# Patient Record
Sex: MALE | Race: WHITE | NOT HISPANIC OR LATINO | Employment: OTHER | URBAN - METROPOLITAN AREA
[De-identification: names, ages, dates, MRNs, and addresses within clinical notes are randomized per-mention and may not be internally consistent; named-entity substitution may affect disease eponyms.]

---

## 2017-10-05 ENCOUNTER — TRANSFERRED RECORDS (OUTPATIENT)
Dept: HEALTH INFORMATION MANAGEMENT | Facility: CLINIC | Age: 57
End: 2017-10-05

## 2018-03-07 ENCOUNTER — TELEPHONE (OUTPATIENT)
Dept: ORTHOPEDICS | Facility: OTHER | Age: 58
End: 2018-03-07

## 2019-04-23 ENCOUNTER — TRANSFERRED RECORDS (OUTPATIENT)
Dept: HEALTH INFORMATION MANAGEMENT | Facility: CLINIC | Age: 59
End: 2019-04-23

## 2023-07-10 ENCOUNTER — TRANSFERRED RECORDS (OUTPATIENT)
Dept: MULTI SPECIALTY CLINIC | Facility: CLINIC | Age: 63
End: 2023-07-10

## 2023-07-10 LAB
ALBUMIN (URINE) MG/SPEC: 17.8 MG/DL
ALBUMIN/CREATININE RATIO: 77 (ref 0–29)
CREATININE (EXTERNAL): 0.85 MG/DL (ref 0.7–1.2)
CREATININE (URINE): 232 MG/DL
GFR ESTIMATED (EXTERNAL): 98 ML/MIN/1.73M2
GLUCOSE (EXTERNAL): 150 MG/DL (ref 70–99)
HBA1C MFR BLD: 6.2 % (ref 4–5.6)
POTASSIUM (EXTERNAL): 4 MEQ/L (ref 3.4–5.1)

## 2023-11-09 ENCOUNTER — OFFICE VISIT (OUTPATIENT)
Dept: FAMILY MEDICINE | Facility: OTHER | Age: 63
DRG: 442 | End: 2023-11-09
Payer: COMMERCIAL

## 2023-11-09 ENCOUNTER — HOSPITAL ENCOUNTER (INPATIENT)
Facility: OTHER | Age: 63
LOS: 1 days | Discharge: HOME | DRG: 442 | End: 2023-11-10
Attending: FAMILY MEDICINE | Admitting: INTERNAL MEDICINE
Payer: COMMERCIAL

## 2023-11-09 VITALS
BODY MASS INDEX: 35.42 KG/M2 | OXYGEN SATURATION: 99 % | HEART RATE: 75 BPM | SYSTOLIC BLOOD PRESSURE: 124 MMHG | TEMPERATURE: 97.2 F | WEIGHT: 253 LBS | RESPIRATION RATE: 12 BRPM | HEIGHT: 71 IN | DIASTOLIC BLOOD PRESSURE: 82 MMHG

## 2023-11-09 DIAGNOSIS — E72.20 HYPERAMMONEMIA (H): ICD-10-CM

## 2023-11-09 DIAGNOSIS — K70.30 ALCOHOLIC CIRRHOSIS, UNSPECIFIED WHETHER ASCITES PRESENT (H): Chronic | ICD-10-CM

## 2023-11-09 DIAGNOSIS — F15.10 AMPHETAMINE ABUSE (H): ICD-10-CM

## 2023-11-09 DIAGNOSIS — R41.0 CONFUSION: ICD-10-CM
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
## 2023-11-09 DIAGNOSIS — F22 DELUSIONS OF PARASITOSIS (H): Primary | ICD-10-CM

## 2023-11-09 PROBLEM — Z95.828 PORT-A-CATH IN PLACE: Status: ACTIVE | Noted: 2022-07-08

## 2023-11-09 PROBLEM — E11.9 TYPE 2 DIABETES MELLITUS WITHOUT COMPLICATION (H): Chronic | Status: ACTIVE | Noted: 2021-11-30

## 2023-11-09 PROBLEM — C22.0 HEPATOCELLULAR CARCINOMA (H): Status: ACTIVE | Noted: 2022-10-19

## 2023-11-09 PROBLEM — I85.10 SECONDARY ESOPHAGEAL VARICES WITHOUT BLEEDING (H): Status: ACTIVE | Noted: 2017-03-28

## 2023-11-09 PROBLEM — C81.41: Status: ACTIVE | Noted: 2022-05-04

## 2023-11-09 LAB
ALBUMIN SERPL BCG-MCNC: 3.8 G/DL (ref 3.5–5.2)
ALBUMIN UR-MCNC: 30 MG/DL
ALP SERPL-CCNC: 208 U/L (ref 40–129)
ALT SERPL W P-5'-P-CCNC: 57 U/L (ref 0–70)
AMMONIA PLAS-SCNC: 83 UMOL/L (ref 16–60)
AMPHETAMINES UR QL SCN: ABNORMAL
ANION GAP SERPL CALCULATED.3IONS-SCNC: 9 MMOL/L (ref 7–15)
APPEARANCE UR: CLEAR
AST SERPL W P-5'-P-CCNC: 76 U/L (ref 0–45)
BACTERIA #/AREA URNS HPF: ABNORMAL /HPF
BARBITURATES UR QL SCN: ABNORMAL
BASOPHILS # BLD AUTO: 0 10E3/UL (ref 0–0.2)
BASOPHILS NFR BLD AUTO: 1 %
BENZODIAZ UR QL SCN: ABNORMAL
BILIRUB SERPL-MCNC: 2 MG/DL
BILIRUB UR QL STRIP: NEGATIVE
BUN SERPL-MCNC: 13.7 MG/DL (ref 8–23)
BZE UR QL SCN: ABNORMAL
CALCIUM SERPL-MCNC: 9.2 MG/DL (ref 8.8–10.2)
CANNABINOIDS UR QL SCN: ABNORMAL
CHLORIDE SERPL-SCNC: 105 MMOL/L (ref 98–107)
COLOR UR AUTO: YELLOW
CREAT SERPL-MCNC: 0.65 MG/DL (ref 0.67–1.17)
DEPRECATED HCO3 PLAS-SCNC: 27 MMOL/L (ref 22–29)
EGFRCR SERPLBLD CKD-EPI 2021: >90 ML/MIN/1.73M2
EOSINOPHIL # BLD AUTO: 0.1 10E3/UL (ref 0–0.7)
EOSINOPHIL NFR BLD AUTO: 3 %
ERYTHROCYTE [DISTWIDTH] IN BLOOD BY AUTOMATED COUNT: 14.6 % (ref 10–15)
ETHANOL SERPL-MCNC: <0.01 G/DL
ETHANOL SERPL-MCNC: <0.01 G/DL
FENTANYL UR QL: ABNORMAL
GLUCOSE BLDC GLUCOMTR-MCNC: 88 MG/DL (ref 70–99)
GLUCOSE SERPL-MCNC: 173 MG/DL (ref 70–99)
GLUCOSE UR STRIP-MCNC: NEGATIVE MG/DL
HCT VFR BLD AUTO: 45.2 % (ref 40–53)
HGB BLD-MCNC: 15.5 G/DL (ref 13.3–17.7)
HGB UR QL STRIP: NEGATIVE
HOLD SPECIMEN: NORMAL
HYALINE CASTS: 1 /LPF
IMM GRANULOCYTES # BLD: 0 10E3/UL
IMM GRANULOCYTES NFR BLD: 0 %
KETONES UR STRIP-MCNC: NEGATIVE MG/DL
LEUKOCYTE ESTERASE UR QL STRIP: NEGATIVE
LYMPHOCYTES # BLD AUTO: 0.7 10E3/UL (ref 0.8–5.3)
LYMPHOCYTES NFR BLD AUTO: 15 %
MCH RBC QN AUTO: 32.1 PG (ref 26.5–33)
MCHC RBC AUTO-ENTMCNC: 34.3 G/DL (ref 31.5–36.5)
MCV RBC AUTO: 94 FL (ref 78–100)
MONOCYTES # BLD AUTO: 0.4 10E3/UL (ref 0–1.3)
MONOCYTES NFR BLD AUTO: 8 %
MUCOUS THREADS #/AREA URNS LPF: PRESENT /LPF
NEUTROPHILS # BLD AUTO: 3.6 10E3/UL (ref 1.6–8.3)
NEUTROPHILS NFR BLD AUTO: 73 %
NITRATE UR QL: NEGATIVE
NRBC # BLD AUTO: 0 10E3/UL
NRBC BLD AUTO-RTO: 0 /100
OPIATES UR QL SCN: ABNORMAL
PCP QUAL URINE (ROCHE): ABNORMAL
PH UR STRIP: 5.5 [PH] (ref 5–9)
PLATELET # BLD AUTO: 92 10E3/UL (ref 150–450)
POTASSIUM SERPL-SCNC: 3.6 MMOL/L (ref 3.4–5.3)
PROT SERPL-MCNC: 7.5 G/DL (ref 6.4–8.3)
RBC # BLD AUTO: 4.83 10E6/UL (ref 4.4–5.9)
RBC URINE: 2 /HPF
SODIUM SERPL-SCNC: 141 MMOL/L (ref 135–145)
SP GR UR STRIP: 1.03 (ref 1–1.03)
UROBILINOGEN UR STRIP-MCNC: 8 MG/DL
WBC # BLD AUTO: 4.8 10E3/UL (ref 4–11)
WBC URINE: 3 /HPF

## 2023-11-09 PROCEDURE — 80307 DRUG TEST PRSMV CHEM ANLYZR: CPT | Performed by: FAMILY MEDICINE

## 2023-11-09 PROCEDURE — 85014 HEMATOCRIT: CPT | Mod: ZL

## 2023-11-09 PROCEDURE — 87484 EHRLICHA CHAFFEENSIS AMP PRB: CPT | Mod: ZL

## 2023-11-09 PROCEDURE — 86618 LYME DISEASE ANTIBODY: CPT | Mod: ZL

## 2023-11-09 PROCEDURE — 82140 ASSAY OF AMMONIA: CPT | Mod: ZL

## 2023-11-09 PROCEDURE — 36415 COLL VENOUS BLD VENIPUNCTURE: CPT | Mod: ZL

## 2023-11-09 PROCEDURE — G0463 HOSPITAL OUTPT CLINIC VISIT: HCPCS

## 2023-11-09 PROCEDURE — 99284 EMERGENCY DEPT VISIT MOD MDM: CPT | Performed by: FAMILY MEDICINE

## 2023-11-09 PROCEDURE — 87798 DETECT AGENT NOS DNA AMP: CPT | Mod: ZL

## 2023-11-09 PROCEDURE — 250N000013 HC RX MED GY IP 250 OP 250 PS 637: Performed by: INTERNAL MEDICINE

## 2023-11-09 PROCEDURE — 81003 URINALYSIS AUTO W/O SCOPE: CPT | Performed by: FAMILY MEDICINE

## 2023-11-09 PROCEDURE — 80053 COMPREHEN METABOLIC PANEL: CPT | Mod: ZL

## 2023-11-09 PROCEDURE — 250N000013 HC RX MED GY IP 250 OP 250 PS 637: Performed by: FAMILY MEDICINE

## 2023-11-09 PROCEDURE — 99285 EMERGENCY DEPT VISIT HI MDM: CPT | Performed by: FAMILY MEDICINE

## 2023-11-09 PROCEDURE — 36415 COLL VENOUS BLD VENIPUNCTURE: CPT | Performed by: FAMILY MEDICINE

## 2023-11-09 PROCEDURE — 82077 ASSAY SPEC XCP UR&BREATH IA: CPT | Mod: ZL

## 2023-11-09 PROCEDURE — 120N000001 HC R&B MED SURG/OB

## 2023-11-09 PROCEDURE — 82077 ASSAY SPEC XCP UR&BREATH IA: CPT | Performed by: FAMILY MEDICINE

## 2023-11-09 PROCEDURE — 258N000003 HC RX IP 258 OP 636: Performed by: FAMILY MEDICINE

## 2023-11-09 RX ORDER — PROPRANOLOL HYDROCHLORIDE 80 MG/1
80 CAPSULE, EXTENDED RELEASE ORAL DAILY
Status: DISCONTINUED | OUTPATIENT
Start: 2023-11-10 | End: 2023-11-10 | Stop reason: HOSPADM

## 2023-11-09 RX ORDER — CYANOCOBALAMIN (VITAMIN B-12) 1000 MCG
1 TABLET ORAL DAILY
COMMUNITY

## 2023-11-09 RX ORDER — TAMSULOSIN HYDROCHLORIDE 0.4 MG/1
0.4 CAPSULE ORAL EVERY EVENING
Status: DISCONTINUED | OUTPATIENT
Start: 2023-11-10 | End: 2023-11-10 | Stop reason: HOSPADM

## 2023-11-09 RX ORDER — FERROUS SULFATE 325(65) MG
325 TABLET ORAL
COMMUNITY
Start: 2023-09-08

## 2023-11-09 RX ORDER — LACTULOSE 20 G/30ML
10 SOLUTION ORAL 3 TIMES DAILY
Status: DISCONTINUED | OUTPATIENT
Start: 2023-11-09 | End: 2023-11-10 | Stop reason: HOSPADM

## 2023-11-09 RX ORDER — OXYCODONE HYDROCHLORIDE 5 MG/1
5 TABLET ORAL
Status: ON HOLD | COMMUNITY
Start: 2022-11-19 | End: 2023-11-10

## 2023-11-09 RX ORDER — LEVOFLOXACIN 500 MG/1
TABLET, FILM COATED ORAL
Status: ON HOLD | COMMUNITY
Start: 2022-11-19 | End: 2023-11-10

## 2023-11-09 RX ORDER — PANTOPRAZOLE SODIUM 40 MG/1
40 TABLET, DELAYED RELEASE ORAL
Status: DISCONTINUED | OUTPATIENT
Start: 2023-11-10 | End: 2023-11-10 | Stop reason: HOSPADM

## 2023-11-09 RX ORDER — DEXTROSE MONOHYDRATE 25 G/50ML
25-50 INJECTION, SOLUTION INTRAVENOUS
Status: DISCONTINUED | OUTPATIENT
Start: 2023-11-09 | End: 2023-11-10 | Stop reason: HOSPADM

## 2023-11-09 RX ORDER — NICOTINE POLACRILEX 4 MG
15-30 LOZENGE BUCCAL
Status: DISCONTINUED | OUTPATIENT
Start: 2023-11-09 | End: 2023-11-10 | Stop reason: HOSPADM

## 2023-11-09 RX ORDER — TAMSULOSIN HYDROCHLORIDE 0.4 MG/1
0.4 CAPSULE ORAL
COMMUNITY
Start: 2023-01-08

## 2023-11-09 RX ORDER — FUROSEMIDE 40 MG
TABLET ORAL
COMMUNITY
Start: 2023-09-08

## 2023-11-09 RX ORDER — TRAMADOL HYDROCHLORIDE 50 MG/1
50-100 TABLET ORAL EVERY 6 HOURS PRN
Status: ON HOLD | COMMUNITY
Start: 2023-09-08 | End: 2023-11-10

## 2023-11-09 RX ORDER — ASPIRIN 325 MG
325 TABLET, DELAYED RELEASE (ENTERIC COATED) ORAL DAILY
COMMUNITY

## 2023-11-09 RX ORDER — METFORMIN HCL 500 MG
1000 TABLET, EXTENDED RELEASE 24 HR ORAL 2 TIMES DAILY WITH MEALS
COMMUNITY
Start: 2023-05-24

## 2023-11-09 RX ORDER — VALACYCLOVIR HYDROCHLORIDE 1 G/1
1 TABLET, FILM COATED ORAL
Status: ON HOLD | COMMUNITY
Start: 2023-01-05 | End: 2023-11-10

## 2023-11-09 RX ORDER — BLOOD SUGAR DIAGNOSTIC
STRIP MISCELLANEOUS
COMMUNITY
Start: 2022-12-08

## 2023-11-09 RX ORDER — LANOLIN ALCOHOL/MO/W.PET/CERES
CREAM (GRAM) TOPICAL
COMMUNITY
Start: 2023-09-08

## 2023-11-09 RX ORDER — GABAPENTIN 300 MG/1
1 CAPSULE ORAL EVERY EVENING
Status: ON HOLD | COMMUNITY
Start: 2023-04-10 | End: 2023-11-10

## 2023-11-09 RX ORDER — LACTULOSE 20 G/30ML
20 SOLUTION ORAL DAILY
Status: DISCONTINUED | OUTPATIENT
Start: 2023-11-09 | End: 2023-11-09

## 2023-11-09 RX ORDER — SODIUM CHLORIDE 9 MG/ML
INJECTION, SOLUTION INTRAVENOUS CONTINUOUS
Status: DISCONTINUED | OUTPATIENT
Start: 2023-11-09 | End: 2023-11-10

## 2023-11-09 RX ORDER — OMEGA-3 FATTY ACIDS/FISH OIL 300-1000MG
400 CAPSULE ORAL
Status: ON HOLD | COMMUNITY
End: 2023-11-10

## 2023-11-09 RX ORDER — PROPRANOLOL HYDROCHLORIDE 80 MG/1
80 CAPSULE, EXTENDED RELEASE ORAL DAILY
COMMUNITY
Start: 2023-09-07

## 2023-11-09 RX ADMIN — LACTULOSE 20 G: 20 SOLUTION ORAL at 16:47

## 2023-11-09 RX ADMIN — SODIUM CHLORIDE: 9 INJECTION, SOLUTION INTRAVENOUS at 22:25

## 2023-11-09 RX ADMIN — LACTULOSE 10 G: 20 SOLUTION ORAL at 23:50

## 2023-11-09 RX ADMIN — SODIUM CHLORIDE: 9 INJECTION, SOLUTION INTRAVENOUS at 19:39

## 2023-11-09 ASSESSMENT — ACTIVITIES OF DAILY LIVING (ADL)
ADLS_ACUITY_SCORE: 35
ADLS_ACUITY_SCORE: 35
ADLS_ACUITY_SCORE: 22
ADLS_ACUITY_SCORE: 35

## 2023-11-09 ASSESSMENT — PAIN SCALES - GENERAL: PAINLEVEL: NO PAIN (0)

## 2023-11-09 ASSESSMENT — ENCOUNTER SYMPTOMS: CONFUSION: 1

## 2023-11-09 NOTE — ED TRIAGE NOTES
"Pt was brought over from the [facility].   When this writer questioned why he was in the ER he stated that he did not know.  Called the [facility] for pt information.   Was informed that pt's ammonia level was elevated and he is hallucinating.       Questioned pt on the hallucination and he stated that he sees movement out of the corner of your eyes.   He will sometime see a person or a mouse, etc....  BP (!) 149/95   Pulse 66   Temp 98.1  F (36.7  C) (Temporal)   Resp 16   Ht 1.803 m (5' 11\")   Wt 114.8 kg (253 lb)   SpO2 97%   BMI 35.29 kg/m    Sally Ashton RN on 11/9/2023 at 4:08 PM          Triage Assessment (Adult)       Row Name 11/09/23 1603          Triage Assessment    Airway WDL WDL        Respiratory WDL    Respiratory WDL WDL        Skin Circulation/Temperature WDL    Skin Circulation/Temperature WDL WDL        Cardiac WDL    Cardiac WDL WDL        Cognitive/Neuro/Behavioral WDL    Cognitive/Neuro/Behavioral WDL WDL                     "

## 2023-11-09 NOTE — NURSING NOTE
"Pt presents to [facility] for bugs that he sees coming out of his skin since Sunday night. Pt states \"I put oil on my arms and it made them all come out, so I wiped them off with a towel.\" Pt denies any pain or itching in his arms.  Pt cannot see these spots anywhere else - only on his arms.    Chief Complaint   Patient presents with    Derm Problem     Both forearms       FOOD SECURITY SCREENING QUESTIONS  Hunger Vital Signs:  Within the past 12 months we worried whether our food would run out before we got money to buy more. Never  Within the past 12 months the food we bought just didn't last and we didn't have money to get more. Never  Sanam Dearmon 11/9/2023 1:47 PM      Initial /82 (BP Location: Right arm, Patient Position: Sitting, Cuff Size: Adult Large)   Pulse 75   Temp 97.2  F (36.2  C) (Tympanic)   Resp 12   Ht 1.803 m (5' 11\")   Wt 114.8 kg (253 lb)   SpO2 99%   BMI 35.29 kg/m   Estimated body mass index is 35.29 kg/m  as calculated from the following:    Height as of this encounter: 1.803 m (5' 11\").    Weight as of this encounter: 114.8 kg (253 lb).  Medication Reconciliation: complete    Sanma Espinosa  "
(4) completely dependent

## 2023-11-09 NOTE — PROGRESS NOTES
"ASSESSMENT/PLAN:    Triaged from: [facility]    DIAGNOSIS:   1. Delusions of parasitosis (H)    2. Tick bite, unspecified site, initial encounter    3. Hyperammonemia (H24)    Patient presents with concerns of a rash to his arms that have had worms crawling out of his arms.  He notes this started about 4 days ago.  Patient does have a history of cirrhosis of the liver with alcoholism, he denies any alcohol intake in the past 6 years.  He reports his mood has been stable and no history of mental health issues per his report.  He did have a tick bite a week or 2 ago per his report.  He denies any urinary or abdominal symptoms.  On exam he is alert and oriented to person place and time, cranial nerves intact, no abdominal pain or pressure, vital signs are stable.  Bilateral upper extremities with multiple sores and surrounding ecchymosis as seen in picture below.  No worms appreciated.   I did discuss this case with my collaborating physician who recommends with new mental status change recheck of ammonia level.  This was obtained and was elevated, she recommends transfer to ER.  Discussed this case with ER physician who accepts care of patient.  I discussed with patient laboratory findings at length and my concerns as well as recommendations for further work-up in ER, he is in agreement to this plan and verbalizes understanding.    Initial /82 (BP Location: Right arm, Patient Position: Sitting, Cuff Size: Adult Large)   Pulse 75   Temp 97.2  F (36.2  C) (Tympanic)   Resp 12   Ht 1.803 m (5' 11\")   Wt 114.8 kg (253 lb)   SpO2 99%   BMI 35.29 kg/m   Estimated body mass index is 35.29 kg/m  as calculated from the following:    Height as of this encounter: 1.803 m (5' 11\").    Weight as of this encounter: 114.8 kg (253 lb).    Patient will be transferred to higher level of care.    ESTEFANY DÍAZ CNP        Discussed warning signs/symptoms indicative of need to f/u    Follow up if symptoms persist " or worsen or concerns    I have reviewed the nursing notes.  I have reviewed the findings, diagnosis, plan and need for follow up with the patient.    I explained my diagnostic considerations and recommendations to the patient, who voiced understanding and agreement with the treatment plan. All questions were answered. We discussed potential side effects of any prescribed or recommended therapies, as well as expectations for response to treatments.    JESÚS ABRAHAN STREETER, ESTEFANY CNP  11/9/2023  1:58 PM    HPI:    Elbert Comer is a 63 year old male  who presents to [facility] today for concerns of skin lesions.     Patient reports he has had a rash to bilateral upper extremities that started 4 days ago. He reports that the rash is spreading and there have been white worms crawling out of his sores. He rubbed baking grease on his arms and the worms started crawling out of the sores.      He denies any urinary symptoms, no fevers, no abdominal pain.  He does have a history of alcoholism, but reports that he has not had a drink in 6 years since he was diagnosed with cirrhosis.  He denies any substance use other than tobacco, no illicit drugs or prescribed narcotics.    He reports his mood has been stable, no concerns for him.    Patient reports that he did have a tick bite somewhere around a week ago, it was on his chest and he did remove the tick.  Unknown species.  Unknown duration of attachment.    No known medication allergies.      Past Medical History:   Diagnosis Date    NO ACTIVE PROBLEMS      No past surgical history on file.  Social History     Tobacco Use    Smoking status: Every Day     Packs/day: 0.00     Years: 25.00     Additional pack years: 0.00     Total pack years: 0.00     Types: Cigarettes    Smokeless tobacco: Never    Tobacco comments:     4 -5 qd   Substance Use Topics    Alcohol use: Not Currently     Comment: one beer QD     Current Outpatient Medications   Medication Sig Dispense Refill     aspirin (ASA) 325 MG EC tablet Take 325 mg by mouth daily      blood glucose (NO BRAND SPECIFIED) test strip Check up 2 two times daily      Cyanocobalamin (B-12) 1000 MCG TABS Take 1 tablet by mouth daily      Ergocalciferol 50 MCG (2000 UT) TABS Take  by mouth one time a day. 1 unit of Vitamin D equals 0.025 mcg of Vitamin D      ferrous sulfate (FEROSUL) 325 (65 Fe) MG tablet Take 325 mg by mouth      furosemide (LASIX) 40 MG tablet TAKE ONE TAB BY MOUTH DAILY AS NEEDED FOR FLUID RETENTION      ibuprofen (ADVIL/MOTRIN) 200 MG capsule Take 400 mg by mouth      insulin glargine (LANTUS PEN) 100 UNIT/ML pen 10 units of Lantus insulin in the morning for blood sugars over 140 and none if it is less.      levofloxacin (LEVAQUIN) 500 MG tablet       MAGNESIUM OXIDE 400 (240 Mg) MG tablet TAKE 1 TAB BY MOUTH TWICE DAILY      metFORMIN (GLUCOPHAGE XR) 500 MG 24 hr tablet Take 1,000 mg by mouth      omeprazole (PRILOSEC) 20 MG DR capsule Take 20 mg by mouth      ONETOUCH ULTRA test strip USE TO CHECK BLOOD SUGAR UP 2 TWO TIMES DAILY      oxyCODONE (ROXICODONE) 5 MG tablet Take 5 mg by mouth      PERCOCET 5-325 MG OR TABS 1-2 TABLET EVERY 4 TO 6 HOURS AS NEEDED 30 0    propranolol ER (INDERAL LA) 80 MG 24 hr capsule Take 80 mg by mouth      tamsulosin (FLOMAX) 0.4 MG capsule Take 0.4 mg by mouth      traMADol (ULTRAM) 50 MG tablet Take  mg by mouth every 6 hours as needed for severe pain      valACYclovir (VALTREX) 1000 mg tablet Take 1 tablet by mouth 3 times daily      gabapentin (NEURONTIN) 300 MG capsule Take 1 capsule by mouth every evening (Patient not taking: Reported on 11/9/2023)       Allergies   Allergen Reactions    No Known Drug Allergy      Past medical history, past surgical history, current medications and allergies reviewed and accurate to the best of my knowledge.      ROS:  Refer to HPI    /82 (BP Location: Right arm, Patient Position: Sitting, Cuff Size: Adult Large)   Pulse 75   Temp 97.2  " F (36.2  C) (Tympanic)   Resp 12   Ht 1.803 m (5' 11\")   Wt 114.8 kg (253 lb)   SpO2 99%   BMI 35.29 kg/m      EXAM:  General Appearance: Well appearing 63 year old male, appropriate appearance for age. No acute distress   Ears: Left TM intact, translucent with bony landmarks appreciated, no erythema, no effusion, no bulging, no purulence.  Right TM intact, translucent with bony landmarks appreciated, no erythema, no effusion, no bulging, no purulence.  Left auditory canal clear.  Right auditory canal clear.  Normal external ears, non tender.  Eyes: conjunctivae normal without erythema or irritation, corneas clear, no drainage or crusting, no eyelid swelling, pupils equal   Oropharynx: moist mucous membranes, posterior pharynx with erythema, no exudates or petechiae, no post nasal drip seen, no trismus, voice clear.    Sinuses:  No sinus tenderness upon palpation of the frontal or maxillary sinuses  Nose:  Bilateral nares: no erythema, no edema, no drainage or congestion   Neck: supple without adenopathy  Respiratory: normal chest wall and respirations.  Normal effort.  Clear to auscultation bilaterally, no wheezing, crackles or rhonchi.  No increased work of breathing.  No cough appreciated.  Cardiac: RRR with no murmurs  Abdomen: nontender, no rigidity, no rebound tenderness or guarding, normal bowel sounds present  Musculoskeletal:  Equal movement of bilateral upper extremities.  Equal movement of bilateral lower extremities.  Normal gait.    Dermatological: Bilateral upper extremities with multiple scabbed sores with surrounding ecchymosis, no parasites or insects appreciated at wound sites, no drainage from the wounds, no surrounding erythema, pictured below.  Neuro: Alert and oriented to person, place, and time.  Cranial nerves II-XII grossly intact with no focal or lateralizing deficits.  Muscle tone normal.  Gait normal. No tremor.   Psychological: normal affect, alert, oriented, and pleasant. "     Labs:  Results for orders placed or performed during the hospital encounter of 11/09/23   [place] Draw     Status: None    Narrative    The following orders were created for panel order [place] Draw.  Procedure                               Abnormality         Status                     ---------                               -----------         ------                     Extra Blue Top Tube[[id number]]                              Final result               Extra Red Top Tube[[id number]]                               Final result               Extra Green Top (Lithium...[[id number]]                      Final result               Extra Purple Top Tube[[id number]]                            Final result               Extra Green Top (Lithium...[[id number]]                      Final result                 Please view results for these tests on the individual orders.   Extra Blue Top Tube     Status: None   Result Value Ref Range    Hold Specimen x    Extra Red Top Tube     Status: None   Result Value Ref Range    Hold Specimen x    Extra Green Top (Lithium Heparin) Tube     Status: None   Result Value Ref Range    Hold Specimen x    Extra Purple Top Tube     Status: None   Result Value Ref Range    Hold Specimen x    Extra Green Top (Lithium Heparin) ON ICE     Status: None   Result Value Ref Range    Hold Specimen x    Ethanol GH     Status: Normal   Result Value Ref Range    Alcohol ethyl <0.01 <=0.01 g/dL   Results for orders placed or performed in visit on 11/09/23   Ethanol     Status: Normal   Result Value Ref Range    Alcohol ethyl <0.01 <=0.01 g/dL   Ammonia     Status: Abnormal   Result Value Ref Range    Ammonia 83 (H) 16 - 60 umol/L   Comprehensive Metabolic Panel     Status: Abnormal   Result Value Ref Range    Sodium 141 135 - 145 mmol/L    Potassium 3.6 3.4 - 5.3 mmol/L    Carbon Dioxide (CO2) 27 22 - 29 mmol/L    Anion Gap 9 7 - 15 mmol/L    Urea Nitrogen 13.7 8.0 - 23.0 mg/dL    Creatinine 0.65 (L)  0.67 - 1.17 mg/dL    GFR Estimate >90 >60 mL/min/1.73m2    Calcium 9.2 8.8 - 10.2 mg/dL    Chloride 105 98 - 107 mmol/L    Glucose 173 (H) 70 - 99 mg/dL    Alkaline Phosphatase 208 (H) 40 - 129 U/L    AST 76 (H) 0 - 45 U/L    ALT 57 0 - 70 U/L    Protein Total 7.5 6.4 - 8.3 g/dL    Albumin 3.8 3.5 - 5.2 g/dL    Bilirubin Total 2.0 (H) <=1.2 mg/dL   CBC with platelets and differential     Status: Abnormal   Result Value Ref Range    WBC Count 4.8 4.0 - 11.0 10e3/uL    RBC Count 4.83 4.40 - 5.90 10e6/uL    Hemoglobin 15.5 13.3 - 17.7 g/dL    Hematocrit 45.2 40.0 - 53.0 %    MCV 94 78 - 100 fL    MCH 32.1 26.5 - 33.0 pg    MCHC 34.3 31.5 - 36.5 g/dL    RDW 14.6 10.0 - 15.0 %    Platelet Count 92 (L) 150 - 450 10e3/uL    % Neutrophils 73 %    % Lymphocytes 15 %    % Monocytes 8 %    % Eosinophils 3 %    % Basophils 1 %    % Immature Granulocytes 0 %    NRBCs per 100 WBC 0 <1 /100    Absolute Neutrophils 3.6 1.6 - 8.3 10e3/uL    Absolute Lymphocytes 0.7 (L) 0.8 - 5.3 10e3/uL    Absolute Monocytes 0.4 0.0 - 1.3 10e3/uL    Absolute Eosinophils 0.1 0.0 - 0.7 10e3/uL    Absolute Basophils 0.0 0.0 - 0.2 10e3/uL    Absolute Immature Granulocytes 0.0 <=0.4 10e3/uL    Absolute NRBCs 0.0 10e3/uL   Extra Tube     Status: None    Narrative    The following orders were created for panel order Extra Tube.  Procedure                               Abnormality         Status                     ---------                               -----------         ------                     Extra Blue Top Tube[[id number]]                              Final result                 Please view results for these tests on the individual orders.   Extra Blue Top Tube     Status: None   Result Value Ref Range    Hold Specimen x    CBC and Differential     Status: Abnormal    Narrative    The following orders were created for panel order CBC and Differential.  Procedure                               Abnormality         Status                     ---------                                -----------         ------                     CBC with platelets and d...[[id number]]  Abnormal            Final result                 Please view results for these tests on the individual orders.

## 2023-11-10 VITALS
HEART RATE: 63 BPM | OXYGEN SATURATION: 95 % | WEIGHT: 251 LBS | TEMPERATURE: 97.4 F | SYSTOLIC BLOOD PRESSURE: 111 MMHG | HEIGHT: 71 IN | RESPIRATION RATE: 16 BRPM | DIASTOLIC BLOOD PRESSURE: 68 MMHG | BODY MASS INDEX: 35.14 KG/M2

## 2023-11-10 LAB
ALBUMIN SERPL BCG-MCNC: 3.2 G/DL (ref 3.5–5.2)
ALP SERPL-CCNC: 151 U/L (ref 40–129)
ALT SERPL W P-5'-P-CCNC: 48 U/L (ref 0–70)
AMMONIA PLAS-SCNC: 31 UMOL/L (ref 16–60)
ANION GAP SERPL CALCULATED.3IONS-SCNC: 9 MMOL/L (ref 7–15)
AST SERPL W P-5'-P-CCNC: 63 U/L (ref 0–45)
B BURGDOR IGG+IGM SER QL: 0.19
BILIRUB SERPL-MCNC: 2.7 MG/DL
BUN SERPL-MCNC: 12.1 MG/DL (ref 8–23)
CALCIUM SERPL-MCNC: 8.4 MG/DL (ref 8.8–10.2)
CHLORIDE SERPL-SCNC: 104 MMOL/L (ref 98–107)
CREAT SERPL-MCNC: 0.68 MG/DL (ref 0.67–1.17)
DEPRECATED HCO3 PLAS-SCNC: 26 MMOL/L (ref 22–29)
EGFRCR SERPLBLD CKD-EPI 2021: >90 ML/MIN/1.73M2
ERYTHROCYTE [DISTWIDTH] IN BLOOD BY AUTOMATED COUNT: 14.6 % (ref 10–15)
GLUCOSE BLDC GLUCOMTR-MCNC: 114 MG/DL (ref 70–99)
GLUCOSE BLDC GLUCOMTR-MCNC: 202 MG/DL (ref 70–99)
GLUCOSE SERPL-MCNC: 128 MG/DL (ref 70–99)
HCT VFR BLD AUTO: 41.1 % (ref 40–53)
HGB BLD-MCNC: 14.1 G/DL (ref 13.3–17.7)
MCH RBC QN AUTO: 32.2 PG (ref 26.5–33)
MCHC RBC AUTO-ENTMCNC: 34.3 G/DL (ref 31.5–36.5)
MCV RBC AUTO: 94 FL (ref 78–100)
PLATELET # BLD AUTO: 68 10E3/UL (ref 150–450)
POTASSIUM SERPL-SCNC: 3.6 MMOL/L (ref 3.4–5.3)
PROT SERPL-MCNC: 6.3 G/DL (ref 6.4–8.3)
RBC # BLD AUTO: 4.38 10E6/UL (ref 4.4–5.9)
SODIUM SERPL-SCNC: 139 MMOL/L (ref 135–145)
WBC # BLD AUTO: 4.3 10E3/UL (ref 4–11)

## 2023-11-10 PROCEDURE — 36415 COLL VENOUS BLD VENIPUNCTURE: CPT | Performed by: INTERNAL MEDICINE

## 2023-11-10 PROCEDURE — 250N000011 HC RX IP 250 OP 636: Performed by: INTERNAL MEDICINE

## 2023-11-10 PROCEDURE — G0008 ADMIN INFLUENZA VIRUS VAC: HCPCS | Performed by: INTERNAL MEDICINE

## 2023-11-10 PROCEDURE — 90682 RIV4 VACC RECOMBINANT DNA IM: CPT | Performed by: INTERNAL MEDICINE

## 2023-11-10 PROCEDURE — 80053 COMPREHEN METABOLIC PANEL: CPT | Performed by: INTERNAL MEDICINE

## 2023-11-10 PROCEDURE — 250N000013 HC RX MED GY IP 250 OP 250 PS 637: Performed by: INTERNAL MEDICINE

## 2023-11-10 PROCEDURE — 250N000012 HC RX MED GY IP 250 OP 636 PS 637: Performed by: INTERNAL MEDICINE

## 2023-11-10 PROCEDURE — 99239 HOSP IP/OBS DSCHRG MGMT >30: CPT | Performed by: INTERNAL MEDICINE

## 2023-11-10 PROCEDURE — 85027 COMPLETE CBC AUTOMATED: CPT | Performed by: INTERNAL MEDICINE

## 2023-11-10 PROCEDURE — 82140 ASSAY OF AMMONIA: CPT | Performed by: INTERNAL MEDICINE

## 2023-11-10 RX ORDER — LACTULOSE 20 G/30ML
10 SOLUTION ORAL 3 TIMES DAILY
Qty: 473 ML | Refills: 0 | Status: SHIPPED | OUTPATIENT
Start: 2023-11-10

## 2023-11-10 RX ORDER — LACTULOSE 20 G/30ML
10 SOLUTION ORAL 3 TIMES DAILY
Qty: 473 ML | Refills: 0 | Status: SHIPPED | OUTPATIENT
Start: 2023-11-10 | End: 2023-11-10

## 2023-11-10 RX ADMIN — INSULIN ASPART 1 UNITS: 100 INJECTION, SOLUTION INTRAVENOUS; SUBCUTANEOUS at 12:08

## 2023-11-10 RX ADMIN — LACTULOSE 10 G: 20 SOLUTION ORAL at 13:21

## 2023-11-10 RX ADMIN — TAMSULOSIN HYDROCHLORIDE 0.4 MG: 0.4 CAPSULE ORAL at 00:45

## 2023-11-10 RX ADMIN — PANTOPRAZOLE SODIUM 40 MG: 40 TABLET, DELAYED RELEASE ORAL at 07:56

## 2023-11-10 RX ADMIN — PROPRANOLOL HYDROCHLORIDE 80 MG: 80 CAPSULE, EXTENDED RELEASE ORAL at 10:46

## 2023-11-10 RX ADMIN — LACTULOSE 10 G: 20 SOLUTION ORAL at 06:23

## 2023-11-10 RX ADMIN — INFLUENZA A VIRUS A/WEST VIRGINIA/30/2022 (H1N1) RECOMBINANT HEMAGGLUTININ ANTIGEN, INFLUENZA A VIRUS A/DARWIN/6/2021 (H3N2) RECOMBINANT HEMAGGLUTININ ANTIGEN, INFLUENZA B VIRUS B/AUSTRIA/1359417/2021 RECOMBINANT HEMAGGLUTININ ANTIGEN, AND INFLUENZA B VIRUS B/PHUKET/3073/2013 RECOMBINANT HEMAGGLUTININ ANTIGEN 0.5 ML: 45; 45; 45; 45 INJECTION INTRAMUSCULAR at 11:28

## 2023-11-10 ASSESSMENT — ACTIVITIES OF DAILY LIVING (ADL)
ADLS_ACUITY_SCORE: 22

## 2023-11-10 NOTE — PROVIDER NOTIFICATION
11/09/23 2205   Valuables   Patient Belongings remains with patient;locker   Patient Belongings Remaining with Patient other (see comments);clothing;cell phone/electronics;purse/wallet   Patient Belongings Put in Hospital Secure Location (Security or Locker, etc.) wallet;other (see comments)   Did you bring any home meds/supplements to the hospital?  [facility] will make every effort per our policy to help keep your items safe while in the hospital.  If you choose to keep any items at the bedside, we cannot be held responsible for any items that are lost or broken.      List items sent to safe: Wallet, pocket knife    I have reviewed my belongings list on admission and verify that it is correct.     Patient signature_______________________________  Date/Time_____________________    2nd Staff person if patient unable to sign __________________________  Date/Time ______________________      I have received all my belongings noted above at discharge.    Patient signature________________________________  Date/Time  __________________________

## 2023-11-10 NOTE — PLAN OF CARE
SAFETY CHECKLIST  ID Bands and Risk clasps correct and in place (DNR, Fall risk, Allergy, Latex, Limb):  Yes  All Lines Reconciled and labeled correctly: Yes  Whiteboard updated:Yes  Environmental interventions: Yes  Verify Tele #: N/A    Sherie Benson RN .......  11/10/2023  7:30 AM

## 2023-11-10 NOTE — H&P
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, thrombocytopenia  H/o hepatocellular carcinoma s/p RFA   -cont propranolol and PPI    Substance abuse  -pt reports smoking marijuana but denies methamphetamine use  -UDS + to amphetamine and marijuana  -no EtoH    H/o Hodgkin's lymphoma  -s/p chemo, completed 8/2022    HTN  -cont propranolol    Type 2 DM  -last A1C 6.2  -ISS  -hold metformin    BPH  -cont tamsulosin     Diet:  mod carb  DVT Prophylaxis: scds  Leo Catheter: Not present  Lines: None     Code Status:  FULL    Clinically Significant Risk Factors Present on Admission      # Drug Induced Platelet Defect: home medication list includes an antiplatelet medication   # Hypertension: Noted on problem list      # Obesity: Estimated body mass index is 35.29 kg/m  as calculated from the following:    Height as of this encounter: 1.803 m (5' 11\").    Weight as of this encounter: 114.8 kg (253 lb).              Disposition Plan      Expected Discharge Date: 11/11/2023                The patient's care was discussed with the Patient.        YOUNG LACEY MD  [facility]  Securely message with the Vocera Web Console (learn more here)  Text page via [facility] Paging/Directory      Visit/Communication Style   Virtual (Video) communication was used to evaluate Elbert.  Elbert consented to the use of video communication: yes  Video START time: 2315, 11/9/2023  Video STOP time: 2325, 11/9/2023   Patient's location: [facility]   Provider's location during the visit: [facility] Tele-medicine site        ______________________________________________________________________    Chief Complaint   Bugs on skin    History of Present Illness   63yoM with ABRAMS " cirrhosis, Type 2 DM, HTN, and h/o HCC and Hodgkin's lymphoma presented to the ED from urgent care clinic with confusion and concern about bugs in his skin.  He says he has seen worms under his skin.  He has been applying cooking grease to his skin and this causes them to come out.  He has been picking at areas on his arms.  He does not think he is confused.    He denies fever, chills, abdominal pain, chest pain, sob, cough.    He does admit to smoking marijuana but denies amphetamine usage.    Review of Systems    General: negative for fever, chills, sweats, weakness  Eyes: negative for blurred vision, loss of vision  Ear Nose and Throat: negative for pharyngitis, speech or swallowing difficulties  Respiratory:  negative for sputum production, wheezing, MATHEWS, pleuritic pain, sob or cough  Cardiology:  negative for chest pain, palpitations, orthopnea, PND, edema, syncope   Gastrointestinal: negative for abdominal pain, nausea, vomiting, diarrhea, constipation, hematemesis, melena or hematochezia  Genitourinary: negative for frequency, urgency, dysuria, hematuria   Neurological: negative for focal weakness, paresthesia    Past Medical History    I have reviewed this patient's medical history and updated it with pertinent information if needed.   Past Medical History:   Diagnosis Date    NO ACTIVE PROBLEMS        Past Surgical History   I have reviewed this patient's surgical history and updated it with pertinent information if needed.  No past surgical history on file.    Social History   I have reviewed this patient's social history and updated it with pertinent information if needed.  Social History     Tobacco Use    Smoking status: Every Day     Packs/day: 0.00     Years: 25.00     Additional pack years: 0.00     Total pack years: 0.00     Types: Cigarettes    Smokeless tobacco: Never    Tobacco comments:     4 -5 qd   Vaping Use    Vaping Use: Never used   Substance Use Topics    Alcohol use: Not Currently      Comment: one beer QD    Drug use: Yes     Types: Marijuana       Family History   I have reviewed this patient's family history and updated it with pertinent information if needed.  Family History   Problem Relation Age of Onset    Diabetes Father         adult    Hypertension Mother         medication    Hypertension Father         medication    Hypertension Paternal Grandfather     Hypertension Paternal Grandmother     Cerebrovascular Disease Father         after kidney operation    Cancer Maternal Grandfather         bone       Prior to Admission Medications   Prior to Admission Medications   Prescriptions Last Dose Informant Patient Reported? Taking?   Cyanocobalamin (B-12) 1000 MCG TABS   Yes No   Sig: Take 1 tablet by mouth daily   Ergocalciferol 50 MCG (2000 UT) TABS   Yes No   Sig: Take  by mouth one time a day. 1 unit of Vitamin D equals 0.025 mcg of Vitamin D   MAGNESIUM OXIDE 400 (240 Mg) MG tablet   Yes No   Sig: TAKE 1 TAB BY MOUTH TWICE DAILY   ONETOUCH ULTRA test strip   Yes No   Sig: USE TO CHECK BLOOD SUGAR UP 2 TWO TIMES DAILY   PERCOCET 5-325 MG OR TABS   No No   Si-2 TABLET EVERY 4 TO 6 HOURS AS NEEDED   aspirin (ASA) 325 MG EC tablet   Yes No   Sig: Take 325 mg by mouth daily   blood glucose (NO BRAND SPECIFIED) test strip   Yes No   Sig: Check up 2 two times daily   ferrous sulfate (FEROSUL) 325 (65 Fe) MG tablet   Yes No   Sig: Take 325 mg by mouth   furosemide (LASIX) 40 MG tablet   Yes No   Sig: TAKE ONE TAB BY MOUTH DAILY AS NEEDED FOR FLUID RETENTION   gabapentin (NEURONTIN) 300 MG capsule   Yes No   Sig: Take 1 capsule by mouth every evening   Patient not taking: Reported on 2023   ibuprofen (ADVIL/MOTRIN) 200 MG capsule   Yes No   Sig: Take 400 mg by mouth   insulin glargine (LANTUS PEN) 100 UNIT/ML pen   Yes No   Sig: 10 units of Lantus insulin in the morning for blood sugars over 140 and none if it is less.   levofloxacin (LEVAQUIN) 500 MG tablet   Yes No   metFORMIN  (GLUCOPHAGE XR) 500 MG 24 hr tablet   Yes No   Sig: Take 1,000 mg by mouth   omeprazole (PRILOSEC) 20 MG DR capsule   Yes No   Sig: Take 20 mg by mouth   oxyCODONE (ROXICODONE) 5 MG tablet   Yes No   Sig: Take 5 mg by mouth   propranolol ER (INDERAL LA) 80 MG 24 hr capsule   Yes No   Sig: Take 80 mg by mouth   tamsulosin (FLOMAX) 0.4 MG capsule   Yes No   Sig: Take 0.4 mg by mouth   traMADol (ULTRAM) 50 MG tablet   Yes No   Sig: Take  mg by mouth every 6 hours as needed for severe pain   valACYclovir (VALTREX) 1000 mg tablet   Yes No   Sig: Take 1 tablet by mouth 3 times daily      Facility-Administered Medications: None     Allergies   Allergies   Allergen Reactions    No Known Drug Allergy        Physical Exam   Vital Signs: Temp: 98.1  F (36.7  C) Temp src: Temporal BP: (!) 149/95 Pulse: 66   Resp: 16 SpO2: 97 % O2 Device: None (Room air)    Weight: 253 lbs 0 oz    Gen:  Well-developed, well-nourished, in no acute distress, lying semi-supine in hospital stretcher  HEENT:  Anicteric sclera, PER, hearing intact to voice  Resp:  No accessory muscle use, breath sounds clear; no wheezes no rales no rhonchi  Card:  No murmur, normal S1, S2   Abd:  Soft per RN exam, no TTP, non-distended, normoactive bowel sounds are present  Musc:  Normal strength and movement of the major muscle groups without obvious deformity  Psych:  oriented to person, place and time, not anxious, not agitated  Skin: multiple areas of ecchymosis on forearms    Data     Recent Labs   Lab 11/09/23  1500   WBC 4.8   HGB 15.5   MCV 94   PLT 92*      POTASSIUM 3.6   CHLORIDE 105   CO2 27   BUN 13.7   CR 0.65*   ANIONGAP 9   SHOLA 9.2   *   ALBUMIN 3.8   PROTTOTAL 7.5   BILITOTAL 2.0*   ALKPHOS 208*   ALT 57   AST 76*         No results found for this or any previous visit (from the past 24 hour(s)).

## 2023-11-10 NOTE — ED PROVIDER NOTES
11/09/23   7:00 PM    I am accepting the care of this patient from Dr Pierre at change of shift.  Elbert Comer is a 62 yo male here from [facility] with confusion.  He was seen in [facility] and concerned that he had worms crawling out of his skin. This has been going on for four days. He had an elevated ammonia (83) and was transferred to the ED.  Other labs show CBC with platelets 92,000, CMP with alk phos 208, AST 76, total bilirubin 2.0, ethanol zero, UA and UDS not collected apparently, tick borne testing pending. He has been given 20 gram lactulose.     ED Course    UDS positive for amphetamines and MJ, UA negative for UTI.  I think meth would fit with his presentation as well as hyperammonemia. I spoke with Dr Martinez who will admit the patient.     Diagnosis    (E72.20) Hyperammonemia (H24)    (R41.0) Confusion    (K70.30) Alcoholic cirrhosis, unspecified whether ascites present (H)    (F15.10) Amphetamine abuse (H)  Comment: likely meth in UDS        Plan:  Admit           Gus Geronimo MD  11/09/23 1956

## 2023-11-10 NOTE — PROGRESS NOTES
Noted a drop in patients blood pressure. 144/95 in ED. 166/99 upon admission to medical floor. 104/51 automatic and 104/62 manual. Notified provider, stated to monitor for now.

## 2023-11-10 NOTE — DISCHARGE SUMMARY
"[facility]  Hospitalist Discharge Summary      Date of Admission:  11/9/2023  Date of Discharge:  11/10/2023  Discharging Provider: César Clark MD  Discharge Service: Hospitalist Service    Discharge Diagnoses   Hepatic encephalopathy  Parasitosis  ABRAMS cirrhosis complicated by varices, thrombocytopenia  Hepatocellular carcinoma s/p RFA   Substance abuse   Hodgkin's lymphoma s/p chemo, completed 8/2022   Hypertension  Type 2 diabetes mellitus   Benign prostatic hypertrophy  Amphetamine abuse    Clinically Significant Risk Factors     # Obesity: Estimated body mass index is 35.01 kg/m  as calculated from the following:    Height as of this encounter: 1.803 m (5' 11\").    Weight as of this encounter: 113.9 kg (251 lb).       Follow-ups Needed After Discharge   Follow-up Appointments     Follow-up and recommended labs and tests       Follow up with primary care provider, Demi Gaston, within 7 days to   evaluate after surgery.  The following labs/tests are recommended: CMP and   ammonia.  Patient was ammonia level was 31            Unresulted Labs Ordered in the Past 30 Days of this Admission       Date and Time Order Name Status Description    11/9/2023  2:45 PM LYME DISEASE TOTAL ABS BLD WITH REFLEX TO CONFIRM CLIA In process     11/9/2023  2:45 PM BABESIA SPECIES BY PCR In process     11/9/2023  2:45 PM EHRLICHIA ANAPLASMA SP BY PCR In process         These results will be followed up by Demi Gaston      Discharge Disposition   Discharged to home  Condition at discharge: Stable    Hospital Course   Elbert Comer is a 63 year old with ABRAMS cirrhosis, Type 2 DM, HTN, and h/o HCC and Hodgkin's lymphoma presented to the [facility] 11/9/2023 from urgent care clinic with confusion and concerned about bugs in the skin with patient have not seen worms on his skin his urine did not show any signs of UTI, drug screen was positive for methamphetamine and cannabinoids ammonia was found " to be high at 83 for which lactulose was started and decreased to 31     Consultations This Hospital Stay   None    Code Status   Full Code    Time Spent on this Encounter   I, César Clark MD, personally saw the patient today and spent greater than 30 minutes discharging this patient.       César Clark MD  [facility]  [address]  [place] [address]  Phone: [phone]  Fax: [phone]  ______________________________________________________________________    Physical Exam   Vital Signs: Temp: 97.4  F (36.3  C) Temp src: Tympanic BP: 111/68 Pulse: 63   Resp: 16 SpO2: 95 % O2 Device: None (Room air)    Weight: 251 lbs 0 oz    GENERAL: Patient is not in acute distress  HEENT: EOM+,Conjunctiva is clear   NECK:  no Jugular Venous distention  HEART: S1 S2 regular Rate and Rhythm,   LUNGS: Respirations are not laboured, Lungs are clear to auscultation without Crepitations or Wheezing   ABDOMEN: Soft, there is no tenderness ,Bowel Sounds are  Positive   LOWER LIMBS: no Pedal Edema  Bilaterally   CNS:  Alert,  Oriented x 3, Moving all the Four Limbs I did not elicit asterixis       Primary Care Physician   Demi Gaston    Discharge Orders      Reason for your hospital stay    Came in with confusion with the sensations that bugs are crawling over his skin and high ammonia level of 83 he did have bowel movements and is much more alert and oriented with ammonia coming down to 31     Follow-up and recommended labs and tests     Follow up with primary care provider, Demi Gaston, within 7 days to evaluate after surgery.  The following labs/tests are recommended: CMP and ammonia.  Patient was ammonia level was 31     Activity    Your activity upon discharge: activity as tolerated     Diet    Follow this diet upon discharge: Orders Placed This Encounter      Combination Diet Regular Diet Adult; Moderate Consistent Carb (60 g CHO per Meal) Diet       Significant Results and  Procedures   Most Recent 3 CBC's:  Recent Labs   Lab Test 11/10/23  0715 23  1500   WBC 4.3 4.8   HGB 14.1 15.5   MCV 94 94   PLT 68* 92*     Most Recent 3 BMP's:  Recent Labs   Lab Test 11/10/23  1127 11/10/23  0738 11/10/23  0715 23  2236 23  1500 19  0000   0000   NA  --   --  139  --  141  --   --    POTASSIUM  --   --  3.6  --  3.6  --   --    CHLORIDE  --   --  104  --  105 105  --    CO2  --   --  26  --  27  --   --    BUN  --   --  12.1  --  13.7  --   --    CR  --   --  0.68  --  0.65*  --   --    ANIONGAP  --   --  9  --  9  --   --    SHOLA  --   --  8.4*  --  9.2  --   --    * 114* 128*   < > 173*  --    < >    < > = values in this interval not displayed.     Most Recent 2 LFT's:  Recent Labs   Lab Test 11/10/23  0715 23  1500   AST 63* 76*   ALT 48 57   ALKPHOS 151* 208*   BILITOTAL 2.7* 2.0*     Most Recent 3 INR's:  Recent Labs   Lab Test 19  0000   INR 1.2*     Most Recent Urinalysis:  Recent Labs   Lab Test 237   COLOR Yellow   APPEARANCE Clear   URINEGLC Negative   URINEBILI Negative   URINEKETONE Negative   SG 1.034*   UBLD Negative   URINEPH 5.5   PROTEIN 30*   NITRITE Negative   LEUKEST Negative   RBCU 2   WBCU 3   ,   Results for orders placed or performed in visit on 03   CHEST X-RAY 2 VW    Narrative    Elbert Comer             : 1960  #                             CHART# [id number]    Phone #:[phone] (home)     Sex: male   Age: 42   year old    AUTHORIZING PROVIDER:  Errol Jacob MD   PRIMARY CARE PROVIDER:  Errol Jacob MD     [facility]    DATE OF EXAM:  3/17/2003     EXAMINATION OR TREATMENT OF:  Chest, two views     CLINICAL HISTORY:   Family history of coronary artery disease; Abnormal   respirations     FINDINGS:    Two views of the chest were obtained. No previous films   available for comparison. Cardiac silhouette and   pulmonary vasculature are within  normal limits. Lungs   are clear bilaterally.     IMPRESSION:    No evidence of acute pulmonary disease.         Jay Hoang MD    Dictated:  3/18/2003   Transcribed:  3/18/2003/jagdish    RADIOLOGY REPORT                      Discharge Medications   Current Discharge Medication List        START taking these medications    Details   lactulose 20 GM/30ML solution Take 15 mLs (10 g) by mouth 3 times daily  Qty: 473 mL, Refills: 0    Associated Diagnoses: Confusion           CONTINUE these medications which have NOT CHANGED    Details   aspirin (ASA) 325 MG EC tablet Take 325 mg by mouth daily      !! blood glucose (NO BRAND SPECIFIED) test strip Check up 2 two times daily      Cyanocobalamin (B-12) 1000 MCG TABS Take 1 tablet by mouth daily      Ergocalciferol 50 MCG (2000 UT) TABS Take  by mouth one time a day. 1 unit of Vitamin D equals 0.025 mcg of Vitamin D      ferrous sulfate (FEROSUL) 325 (65 Fe) MG tablet Take 325 mg by mouth      furosemide (LASIX) 40 MG tablet TAKE ONE TAB BY MOUTH DAILY AS NEEDED FOR FLUID RETENTION      gabapentin (NEURONTIN) 300 MG capsule Take 1 capsule by mouth every evening      insulin glargine (LANTUS PEN) 100 UNIT/ML pen 10 units of Lantus insulin in the morning for blood sugars over 140 and none if it is less.      MAGNESIUM OXIDE 400 (240 Mg) MG tablet TAKE 1 TAB BY MOUTH TWICE DAILY      metFORMIN (GLUCOPHAGE XR) 500 MG 24 hr tablet Take 1,000 mg by mouth      omeprazole (PRILOSEC) 20 MG DR capsule Take 20 mg by mouth      !! ONETOUCH ULTRA test strip USE TO CHECK BLOOD SUGAR UP 2 TWO TIMES DAILY      oxyCODONE (ROXICODONE) 5 MG tablet Take 5 mg by mouth      PERCOCET 5-325 MG OR TABS 1-2 TABLET EVERY 4 TO 6 HOURS AS NEEDED  Qty: 30, Refills: 0    Associated Diagnoses: Rotator cuff (capsule) sprain      propranolol ER (INDERAL LA) 80 MG 24 hr capsule Take 80 mg by mouth      tamsulosin (FLOMAX) 0.4 MG capsule Take 0.4 mg by mouth      traMADol (ULTRAM) 50 MG tablet Take  mg  by mouth every 6 hours as needed for severe pain      valACYclovir (VALTREX) 1000 mg tablet Take 1 tablet by mouth 3 times daily       !! - Potential duplicate medications found. Please discuss with provider.        STOP taking these medications       ibuprofen (ADVIL/MOTRIN) 200 MG capsule Comments:   Reason for Stopping:         levofloxacin (LEVAQUIN) 500 MG tablet Comments:   Reason for Stopping:             Allergies   Allergies   Allergen Reactions    No Known Drug Allergy

## 2023-11-10 NOTE — PHARMACY - DISCHARGE MEDICATION RECONCILIATION AND EDUCATION
Pharmacy:  Discharge Counseling and Medication Reconciliation    Elbert Comer  [address] [facility] [facility] [address]  [phone] (work)  63 year old male  PCP: Demi Gasotn    Allergies: No known drug allergy    Discharge Counseling:    Pharmacist met with patient (and/or family) today to review the medication portion of the After Visit Summary (with an emphasis on NEW medications) and to address patient's questions/concerns.    Summary of Education: Discussed lactulose, indications, side effects, and reason for therapy. Discussed potential for dosing changes as well based on bowel movements. Discussed no changes to medication regimen at this time.    Materials Provided:  MedCounselor sheets printed from Clinical Pharmacology on: Lactulose    Discharge Medication Reconciliation:    It has been determined that the patient has an adequate supply of medications available or which can be obtained from the patient's preferred pharmacy, which HE/SHE has confirmed as: [facility]    Thank you for the consult.    Suzanne Juarez RPH........November 10, 2023 1:24 PM

## 2023-11-10 NOTE — ED PROVIDER NOTES
"  History     Chief Complaint   Patient presents with    Abnormal Labs     Elevated ammonia level    Hallucinations     Visual       HPI  Elbert Comer is a 63 year old male who presents with confusion.  [facility].  He was sent over for confusion and abnormal labs including an elevated ammonia level.  He has a known history and diagnosis of hepatocellular carcinoma from alcoholic liver disease.  Patient states he has been sober about 4 to 5 years.  He then also states sometimes he will have a beer every now and then but admits to no alcohol use this week.  He generally drinks \"and a beer\".  He is followed by KRISTY Brumfield for his care.  He is on propranolol.  Today his concern was he thought bugs were coming out of his skin.  He has been scratching at the area.  He put baking grease on it to see if the worms would come out and thought that they did.  He came in for further evaluation.  He was planning on going to [place] with a friend this weekend.      He does not think specifically that he is confused.  Denies other hallucinations or delusions.  Denies chest pain or shortness of breath.  Denies recent fevers or chills.    Unable to get accurate review of systems due to hyperammonemia and confusion.    Allergies:  Allergies   Allergen Reactions    No Known Drug Allergy        Problem List:    Patient Active Problem List    Diagnosis Date Noted    Confusion 11/09/2023     Priority: Medium    Hyperammonemia (H24) 11/09/2023     Priority: Medium    Alcoholic cirrhosis, unspecified whether ascites present (H) 11/09/2023     Priority: Medium    Hepatocellular carcinoma (H) 10/19/2022     Priority: Medium     Formatting of this note might be different from the original. 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 Biopsy supraclavicular lymph node May 2022 Initiated brentuximab vedotin + AVD chemotherapy C1 6/16 & 6/30--Complicated by febrile neutropenia and Strep salivarius bacteremia      Type 2 diabetes mellitus without complication (H) 11/30/2021     Priority: Medium    Secondary esophageal varices without bleeding (H) 03/28/2017     Priority: Medium    Alcoholic cirrhosis of liver (H) 03/10/2017     Priority: Medium    Essential hypertension 05/01/2007     Priority: Medium    Family history of other cardiovascular diseases 03/25/2003     Priority: Medium     Problem list name updated by automated process. Provider to review and confirm  Problem list name updated by automated process. Provider to review          Past Medical History:    Past Medical History:   Diagnosis Date    NO ACTIVE PROBLEMS        Past Surgical History:    No past surgical history on file.    Family History:    Family History   Problem Relation Age of Onset    Diabetes Father         adult    Hypertension Mother         medication    Hypertension Father         medication    Hypertension Paternal Grandfather     Hypertension Paternal Grandmother     Cerebrovascular Disease Father         after kidney operation    Cancer Maternal Grandfather         bone       Social History:  Marital Status:   [2]  Social History     Tobacco Use    Smoking status: Every Day     Packs/day: 0.00     Years: 25.00     Additional pack years: 0.00     Total pack years: 0.00     Types: Cigarettes    Smokeless tobacco: Never    Tobacco comments:     4 -5 qd   Vaping Use    Vaping Use: Never used   Substance Use Topics    Alcohol use: Not Currently     Comment: one beer QD    Drug use: Yes     Types: Marijuana        Medications:    aspirin (ASA) 325 MG EC tablet  blood glucose (NO BRAND SPECIFIED) test strip  Cyanocobalamin (B-12) 1000 MCG TABS  Ergocalciferol 50 MCG (2000 UT) TABS  ferrous sulfate (FEROSUL) 325 (65 Fe)  "MG tablet  furosemide (LASIX) 40 MG tablet  gabapentin (NEURONTIN) 300 MG capsule  ibuprofen (ADVIL/MOTRIN) 200 MG capsule  insulin glargine (LANTUS PEN) 100 UNIT/ML pen  levofloxacin (LEVAQUIN) 500 MG tablet  MAGNESIUM OXIDE 400 (240 Mg) MG tablet  metFORMIN (GLUCOPHAGE XR) 500 MG 24 hr tablet  omeprazole (PRILOSEC) 20 MG DR capsule  ONETOUCH ULTRA test strip  oxyCODONE (ROXICODONE) 5 MG tablet  PERCOCET 5-325 MG OR TABS  propranolol ER (INDERAL LA) 80 MG 24 hr capsule  tamsulosin (FLOMAX) 0.4 MG capsule  traMADol (ULTRAM) 50 MG tablet  valACYclovir (VALTREX) 1000 mg tablet          Review of Systems   Unable to perform ROS: Acuity of condition   Psychiatric/Behavioral:  Positive for confusion.        Physical Exam   BP: (!) 149/95  Pulse: 66  Temp: 98.1  F (36.7  C)  Resp: 16  Height: 180.3 cm (5' 11\")  Weight: 114.8 kg (253 lb)  SpO2: 97 %      Physical Exam  Vitals and nursing note reviewed.   Constitutional:       General: He is not in acute distress.     Appearance: Normal appearance. He is not toxic-appearing.   HENT:      Head: Normocephalic and atraumatic.      Mouth/Throat:      Mouth: Mucous membranes are moist.   Eyes:      General: No scleral icterus.     Conjunctiva/sclera: Conjunctivae normal.   Cardiovascular:      Rate and Rhythm: Normal rate and regular rhythm.      Pulses: Normal pulses.      Heart sounds: Normal heart sounds.   Pulmonary:      Effort: Pulmonary effort is normal. No respiratory distress.      Breath sounds: Normal breath sounds.   Abdominal:      General: Abdomen is flat.      Palpations: Abdomen is soft.      Tenderness: There is no abdominal tenderness.   Musculoskeletal:         General: No deformity.      Cervical back: Neck supple.   Skin:     General: Skin is warm.   Neurological:      Mental Status: He is alert. He is disoriented.      Comments: Confused thinks that there are worms coming out of his skin.  Not sure where he is.         ED Course              ED Course as of " 11/09/23 1922   Thu Nov 09, 2023   1712 Patient sent over from clinic for elevated ammonia and hallucinations     Procedures              Critical Care time:  none               Results for orders placed or performed during the hospital encounter of 11/09/23 (from the past 24 hour(s))   Mcdonald Draw    Narrative    The following orders were created for panel order Mcdonald Draw.  Procedure                               Abnormality         Status                     ---------                               -----------         ------                     Extra Blue Top Tube[[id number]]                              Final result               Extra Red Top Tube[[id number]]                               Final result               Extra Green Top (Lithium...[[id number]]                      Final result               Extra Purple Top Tube[[id number]]                            Final result               Extra Green Top (Lithium...[[id number]]                      Final result                 Please view results for these tests on the individual orders.   Extra Blue Top Tube   Result Value Ref Range    Hold Specimen x    Extra Red Top Tube   Result Value Ref Range    Hold Specimen x    Extra Green Top (Lithium Heparin) Tube   Result Value Ref Range    Hold Specimen x    Extra Purple Top Tube   Result Value Ref Range    Hold Specimen x    Extra Green Top (Lithium Heparin) ON ICE   Result Value Ref Range    Hold Specimen x    Ethanol GH   Result Value Ref Range    Alcohol ethyl <0.01 <=0.01 g/dL       Medications   lactulose solution 20 g (20 g Oral $Given 11/9/23 2300)   sodium chloride 0.9 % infusion (has no administration in time range)       Assessments & Plan (with Medical Decision Making)     I have reviewed the nursing notes.    I have reviewed the findings, diagnosis, plan and need for follow up with the patient.           Medical Decision Making  The patient's presentation was of high complexity (a chronic illness severe  exacerbation, progression, or side effect of treatment).    The patient's evaluation involved:  ordering and/or review of 3+ test(s) in this encounter (see separate area of note for details)    The patient's management necessitated high risk (a decision regarding hospitalization).        New Prescriptions    No medications on file       Final diagnoses:   Hyperammonemia (H24)   Confusion   Alcoholic cirrhosis, unspecified whether ascites present (H)   Patient will likely need admission to the hospital.  Work-up has been done.  I will sign out to Dr. Geronimo who will call the evening team for further admission and work-up.    11/9/2023   [facility]       Emelina Pierre DO  11/09/23 1927

## 2023-11-10 NOTE — PLAN OF CARE
Goal Outcome Evaluation:      Plan of Care Reviewed With: patient    Overall Patient Progress: improving    Outcome Evaluation: VS stable, afebrile, monitor labs    Pt alert and oriented x 4. VS stable, afebrile. Pt denies pain. LS clear. Pt denies hallucinations. Pt verbalizes that he would like to be discharged today. Labs have stabilized. Plan is for patient to discharge home today.    Sherie Benson RN .......  11/10/2023  12:39 PM

## 2023-11-10 NOTE — PLAN OF CARE
Discharge Note    Data:  Elebrt Comer discharged to home at 1337 via wheel chair. Accompanied by other:self and staff.    Action:  Written discharge/follow-up instructions were provided to patient. Prescriptions sent to patients preferred pharmacy. All belongings sent with patient.    Response:  Patient verbalized understanding of discharge instructions, reason for discharge, and necessary follow-up appointments. Patient verbalized understanding of how to take Lactulose that was prescribed to him.    Sherie Benson RN .......  11/10/2023  1:43 PM

## 2023-11-10 NOTE — PROGRESS NOTES
"[place] [facility] ADMISSION NOTE    Patient admitted to room 306 at approximately 2140 via wheel chair from emergency room. Patient was accompanied by staff.     Verbal SBAR report received from Rama prior to patient arrival.     Patient ambulated to bed independently. Patient alert and oriented X 3. The patient is not having any pain.  . Admission vital signs: Blood pressure (!) 166/99, pulse 62, temperature 97.9  F (36.6  C), temperature source Tympanic, resp. rate 16, height 1.803 m (5' 11\"), weight 113.9 kg (251 lb), SpO2 97%. Patient was oriented to plan of care, bed controls, call light, tv, telephone, bathroom, and visiting hours.     Risk Assessment    The following safety risks were identified during admission: skin. Yellow risk band applied: NO.     Skin Initial Assessment    This writer admitted this patient and completed a full skin assessment and Jude score in the Adult PCS flowsheet. Appropriate interventions initiated as needed.       Jude Risk Assessment  Sensory Perception: 4-->no impairment  Moisture: 4-->rarely moist  Activity: 4-->walks frequently  Mobility: 4-->no limitation  Nutrition: 3-->adequate  Friction and Shear: 3-->no apparent problem  Jude Score: 22  Mattress: Standard gel/foam mattress (IsoFlex, Atmos Air, etc.)  Bed Frame: Standard width and length    Education    Patient has a [place] to Observation order: No  Observation education completed and documented: No      Khushboo Alvarado RN      "

## 2023-11-10 NOTE — PLAN OF CARE
"Goal Outcome Evaluation:  Patient has complaints of bugs coming out of his skin. During tele visit, provider discussed with him his ammonia levels and also the use of amphetamines. Patient has been stating to writer \"I smoke pot, I'll admit that but meth is something I don't do. I'm not hallucinating or imagining things though, I have little white bugs coming out of my skin. I put soria grease on them because I didn't have any Vaseline at home and they were so thick on there\" Patient has bruising to bilateral forearms and scabs to bilateral forearms. Patient has an area on left buttocks that is dry, blanchable redness. Patient has a steady gait. Continent of bowel and bladder. Alert and oriented x4. Patient has +2 edema to bilateral lower extremities. Blood pressure elevated. Patient has a port noted to left side of his chest that is not being accessed at this time.   BP (!) 166/99 (BP Location: Right arm, Patient Position: Semi-Espinoza's, Cuff Size: Adult Regular)   Pulse 62   Temp 97.9  F (36.6  C) (Tympanic)   Resp 16   Ht 1.803 m (5' 11\")   Wt 113.9 kg (251 lb)   SpO2 97%   BMI 35.01 kg/m                         "

## 2023-11-13 ENCOUNTER — PATIENT OUTREACH (OUTPATIENT)
Dept: FAMILY MEDICINE | Facility: CLINIC | Age: 63
End: 2023-11-13
Payer: COMMERCIAL

## 2023-11-13 LAB
A PHAGOCYTOPH DNA BLD QL NAA+PROBE: NOT DETECTED
B MICROTI DNA BLD QL NAA+PROBE: NOT DETECTED
BABESIA DNA BLD QL NAA+PROBE: NOT DETECTED
E CHAFFEENSIS DNA BLD QL NAA+PROBE: NOT DETECTED
E EWINGII DNA SPEC QL NAA+PROBE: NOT DETECTED
EHRLICHIA DNA SPEC QL NAA+PROBE: NOT DETECTED

## 2023-11-13 NOTE — TELEPHONE ENCOUNTER
Patient has PCP elsewhere, no follow-up here. No TCM call required per policy. Follow up on 11/21/23    Mirian Shaikh RN on 11/13/2023 at 10:55 AM

## 2023-11-13 NOTE — TELEPHONE ENCOUNTER
[facility] phone call attempted at 8:40 AM 11/13/2023.      Mirian Shaikh RN on 11/13/2023 at 8:41 AM

## 2025-08-14 ENCOUNTER — HOSPITAL ENCOUNTER (OUTPATIENT)
Dept: GENERAL RADIOLOGY | Facility: CLINIC | Age: 65
Discharge: HOME | End: 2025-08-14
Attending: INTERNAL MEDICINE
Payer: COMMERCIAL

## 2025-08-14 DIAGNOSIS — C22.0 HEPATOCELLULAR CARCINOMA (H): ICD-10-CM

## 2025-08-14 DIAGNOSIS — C22.0 HEPATOCELLULAR CARCINOMA (H): Primary | ICD-10-CM

## 2025-08-14 PROCEDURE — 999N000122 MR MHEALTH OVERREAD

## (undated) RX ORDER — LACTULOSE 20 G/30ML
SOLUTION ORAL
Status: DISPENSED
Start: 2023-11-10

## (undated) RX ORDER — LACTULOSE 20 G/30ML
SOLUTION ORAL
Status: DISPENSED
Start: 2023-11-09